# Patient Record
Sex: FEMALE | Race: WHITE
[De-identification: names, ages, dates, MRNs, and addresses within clinical notes are randomized per-mention and may not be internally consistent; named-entity substitution may affect disease eponyms.]

---

## 2017-01-14 ENCOUNTER — HOSPITAL ENCOUNTER (EMERGENCY)
Dept: HOSPITAL 10 - E/R | Age: 52
Discharge: HOME | End: 2017-01-14
Payer: MEDICAID

## 2017-01-14 VITALS
WEIGHT: 177.47 LBS | WEIGHT: 177.47 LBS | HEIGHT: 62 IN | BODY MASS INDEX: 32.66 KG/M2 | BODY MASS INDEX: 32.66 KG/M2 | HEIGHT: 62 IN

## 2017-01-14 VITALS
SYSTOLIC BLOOD PRESSURE: 118 MMHG | DIASTOLIC BLOOD PRESSURE: 76 MMHG | RESPIRATION RATE: 16 BRPM | TEMPERATURE: 98.1 F | HEART RATE: 78 BPM

## 2017-01-14 DIAGNOSIS — K80.50: Primary | ICD-10-CM

## 2017-01-14 DIAGNOSIS — R07.9: ICD-10-CM

## 2017-01-14 DIAGNOSIS — D64.9: ICD-10-CM

## 2017-01-14 DIAGNOSIS — R11.2: ICD-10-CM

## 2017-01-14 DIAGNOSIS — K21.9: ICD-10-CM

## 2017-01-14 LAB
ADD UMIC: YES
ALBUMIN SERPL-MCNC: 4.2 G/DL
ALBUMIN/GLOB SERPL: 1 {RATIO}
ALP SERPL-CCNC: 108 IU/L
ALT SERPL-CCNC: 36 IU/L
ANION GAP SERPL CALC-SCNC: 16 MMOL/L
APTT BLD: 29.6 SEC
AST SERPL-CCNC: 34 IU/L
BASOPHILS # BLD AUTO: 0.1 10^3/UL
BASOPHILS NFR BLD: 0.6 %
BILIRUB DIRECT SERPL-MCNC: 0 MG/DL
BILIRUB SERPL-MCNC: 0.2 MG/DL
BNP SERPL-MCNC: 129 PG/ML
BUN SERPL-MCNC: 12 MG/DL
CALCIUM SERPL-MCNC: 8.8 MG/DL
CHLORIDE SERPL-SCNC: 107 MMOL/L
CO2 SERPL-SCNC: 22 MMOL/L
COLOR UR: (no result)
CONDITION: 1
CREAT SERPL-MCNC: 0.56 MG/DL
EOSINOPHIL # BLD: 0.1 10^3/UL
EOSINOPHIL NFR BLD: 1 %
ERYTHROCYTE [DISTWIDTH] IN BLOOD BY AUTOMATED COUNT: 17 %
GLOBULIN SER-MCNC: 4.2 G/DL
GLUCOSE SERPL-MCNC: 112 MG/DL
GLUCOSE UR STRIP-MCNC: NEGATIVE %
HCT VFR BLD CALC: 30.7 %
HGB BLD-MCNC: 9.9 G/DL
INR PPP: 1.02
KETONES UR STRIP.AUTO-MCNC: NEGATIVE MG/DL
LH ANALYZER COMMENTS: 1
LYMPHOCYTES # BLD AUTO: 2 10^3/UL
LYMPHOCYTES NFR BLD AUTO: 24.5 %
MCH RBC QN AUTO: 26.5 PG
MCHC RBC AUTO-ENTMCNC: 32.2 G/DL
MCV RBC AUTO: 82.1 FL
MONOCYTES # BLD: 0.4 10^3/UL
MONOCYTES NFR BLD: 4.9 %
NEUTROPHILS # BLD: 5.5 10^3/UL
NEUTROPHILS NFR BLD AUTO: 69 %
NITRITE UR QL STRIP.AUTO: NEGATIVE
NRBC # BLD MANUAL: 0 10^3/UL
NRBC BLD QL: 0 /100WBC
PLATELET # BLD: 420 10^3/UL
PMV BLD AUTO: 7.5 FL
POTASSIUM SERPL-SCNC: 4.3 MMOL/L
PROT SERPL-MCNC: 8.4 G/DL
PROTHROMBIN TIME: 13.4 SEC
PT RATIO: 1
RBC # BLD AUTO: 3.74 10^6/UL
RBC # UR AUTO: (no result) /UL
RBC #/AREA URNS HPF: (no result) /HPF
SODIUM SERPL-SCNC: 141 MMOL/L
SQUAMOUS #/AREA URNS HPF: (no result) /[HPF]
TROPONIN I SERPL-MCNC: < 0.012 NG/ML
URINE BILIRUBIN (DIP): NEGATIVE
URINE TOTAL PROTEIN (DIP): NEGATIVE
UROBILINOGEN UR STRIP-ACNC: (no result)
WBC # BLD AUTO: 8 10^3/UL
WBC # BLD: 8 10^3/UL
WBC # UR STRIP: NEGATIVE /UL

## 2017-01-14 PROCEDURE — 76705 ECHO EXAM OF ABDOMEN: CPT

## 2017-01-14 PROCEDURE — 83880 ASSAY OF NATRIURETIC PEPTIDE: CPT

## 2017-01-14 PROCEDURE — 84484 ASSAY OF TROPONIN QUANT: CPT

## 2017-01-14 PROCEDURE — 96374 THER/PROPH/DIAG INJ IV PUSH: CPT

## 2017-01-14 PROCEDURE — 83690 ASSAY OF LIPASE: CPT

## 2017-01-14 PROCEDURE — 71010: CPT

## 2017-01-14 PROCEDURE — 85025 COMPLETE CBC W/AUTO DIFF WBC: CPT

## 2017-01-14 PROCEDURE — 81003 URINALYSIS AUTO W/O SCOPE: CPT

## 2017-01-14 PROCEDURE — 80053 COMPREHEN METABOLIC PANEL: CPT

## 2017-01-14 PROCEDURE — 81001 URINALYSIS AUTO W/SCOPE: CPT

## 2017-01-14 PROCEDURE — 36415 COLL VENOUS BLD VENIPUNCTURE: CPT

## 2017-01-14 PROCEDURE — 85730 THROMBOPLASTIN TIME PARTIAL: CPT

## 2017-01-14 PROCEDURE — 96375 TX/PRO/DX INJ NEW DRUG ADDON: CPT

## 2017-01-14 PROCEDURE — 93005 ELECTROCARDIOGRAM TRACING: CPT

## 2017-01-14 PROCEDURE — 85610 PROTHROMBIN TIME: CPT

## 2017-01-14 NOTE — RADRPT
PROCEDURE:   Abdominal ultrasound

 

CLINICAL INDICATION:  Abdominal pain

 

TECHNIQUE:   Gray scale, color Doppler, and spectral Doppler ultrasound images of the right upper qu
adrant. 

 

COMPARISON:   Abdominal ultrasound 07/10/2015 

 

FINDINGS:

 

Pancreas:  Visualized portions appear of normal echogenicity, no focal lesions.

 

Liver: 

Morphology:  Normal in size and contour.

Echogenicity:  Increased echogenicity of the liver parenchyma suggestive of hepatic steatosis.

Focal lesions:   None.

Main portal vein: Patent with hepatopetal flow.

 

 

Biliary System: 

Normal appearing gallbladder wall.

Gallstones are present within the gallbladder.

No intra or extra-hepatic biliary dilatation. 

Common bile duct measures 1.8 mm in maximal dimension.   

 

Kidneys:

Right 9.5 cm in length. 

Normal echogenicity.

No hydronephrosis.

No focal lesions or renal calculi.

 

No free fluid identified.

 

 

IMPRESSION:

Cholelithiasis without evidence of abnormal gallbladder wall thickening to suggest cholecystitis.

Normal caliber of the intrahepatic and extrahepatic biliary system.

Increased echogenicity of the liver parenchyma suggestive of hepatic steatosis.  

 

 

RPTAT: AADD

_____________________________________________ 

.Tanner Gresham MD, MD           Date    Time 

Electronically viewed and signed by .Tanner Gresham MD, MD on 01/14/2017 07:40 

 

D:  01/14/2017 07:40  T:  01/14/2017 07:40

.B/

## 2017-01-14 NOTE — RADRPT
PROCEDURE:   CHEST - 1 VIEW  

 

CLINICAL INDICATION:   51-year-old female with chest pain. 

 

TECHNIQUE:   A single frontal AP view of the chest was performed portably.  The images were reviewed
 on a PACS workstation. 

 

COMPARISON:   None.  

 

FINDINGS:

 

The cardiomediastinal silhouette has a normal appearance.  There is no evidence for an infiltrate.  
There is no evidence for congestive heart failure. There is no evidence for pneumothorax. The osseou
s structures are intact. 

 

IMPRESSION:

 

No evidence for active cardiopulmonary disease.

_____________________________________________ 

.Gerald Chiang MD, MD           Date    Time 

Electronically viewed and signed by .Gerald Chiang MD, MD on 01/14/2017 07:46 

 

D:  01/14/2017 07:46  T:  01/14/2017 07:46

.M/

## 2018-05-12 ENCOUNTER — HOSPITAL ENCOUNTER (EMERGENCY)
Dept: HOSPITAL 91 - FTE | Age: 53
Discharge: HOME | End: 2018-05-12
Payer: COMMERCIAL

## 2018-05-12 ENCOUNTER — HOSPITAL ENCOUNTER (EMERGENCY)
Age: 53
Discharge: HOME | End: 2018-05-12

## 2018-05-12 DIAGNOSIS — M25.551: ICD-10-CM

## 2018-05-12 DIAGNOSIS — M25.552: ICD-10-CM

## 2018-05-12 DIAGNOSIS — S70.11XA: Primary | ICD-10-CM

## 2018-05-12 DIAGNOSIS — S30.1XXA: ICD-10-CM
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
## 2018-05-12 PROCEDURE — 76705 ECHO EXAM OF ABDOMEN: CPT

## 2018-05-12 PROCEDURE — 99284 EMERGENCY DEPT VISIT MOD MDM: CPT

## 2018-05-12 PROCEDURE — 73520: CPT

## 2018-05-20 ENCOUNTER — HOSPITAL ENCOUNTER (EMERGENCY)
Age: 53
Discharge: HOME | End: 2018-05-20

## 2018-05-20 ENCOUNTER — HOSPITAL ENCOUNTER (EMERGENCY)
Dept: HOSPITAL 91 - FTE | Age: 53
Discharge: HOME | End: 2018-05-20
Payer: COMMERCIAL

## 2018-05-20 DIAGNOSIS — S70.01XA: ICD-10-CM

## 2018-05-20 DIAGNOSIS — S30.1XXA: Primary | ICD-10-CM

## 2018-05-20 DIAGNOSIS — S70.11XA: ICD-10-CM
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
## 2018-05-20 LAB
ADD MAN DIFF?: NO
ADD UMIC: YES
ALANINE AMINOTRANSFERASE: 33 IU/L (ref 13–69)
ALBUMIN/GLOBULIN RATIO: 1.04
ALBUMIN: 4.4 G/DL (ref 3.3–4.9)
ALKALINE PHOSPHATASE: 125 IU/L (ref 42–121)
ANION GAP: 17 (ref 8–16)
ASPARTATE AMINO TRANSFERASE: 29 IU/L (ref 15–46)
BASOPHIL #: 0 10^3/UL (ref 0–0.1)
BASOPHILS %: 0.2 % (ref 0–2)
BILIRUBIN,DIRECT: 0 MG/DL (ref 0–0.2)
BILIRUBIN,TOTAL: 0.5 MG/DL (ref 0.2–1.3)
BLOOD UREA NITROGEN: 10 MG/DL (ref 7–20)
CALCIUM: 9.1 MG/DL (ref 8.4–10.2)
CARBON DIOXIDE: 28 MMOL/L (ref 21–31)
CHLORIDE: 106 MMOL/L (ref 97–110)
CREATININE: 0.6 MG/DL (ref 0.44–1)
EOSINOPHILS #: 0.2 10^3/UL (ref 0–0.5)
EOSINOPHILS %: 2.4 % (ref 0–7)
GLOBULIN: 4.2 G/DL (ref 1.3–3.2)
GLUCOSE: 116 MG/DL (ref 70–220)
HEMATOCRIT: 43.7 % (ref 37–47)
HEMOGLOBIN: 14.1 G/DL (ref 12–16)
INR: 1.01
LIPASE: 68 U/L (ref 23–300)
LYMPHOCYTES #: 2.4 10^3/UL (ref 0.8–2.9)
LYMPHOCYTES %: 26.9 % (ref 15–51)
MEAN CORPUSCULAR HEMOGLOBIN: 29.2 PG (ref 29–33)
MEAN CORPUSCULAR HGB CONC: 32.3 G/DL (ref 32–37)
MEAN CORPUSCULAR VOLUME: 90.5 FL (ref 82–101)
MEAN PLATELET VOLUME: 9.3 FL (ref 7.4–10.4)
MONOCYTE #: 0.5 10^3/UL (ref 0.3–0.9)
MONOCYTES %: 5.3 % (ref 0–11)
NEUTROPHIL #: 5.6 10^3/UL (ref 1.6–7.5)
NEUTROPHILS %: 64.7 % (ref 39–77)
NUCLEATED RED BLOOD CELLS #: 0 10^3/UL (ref 0–0)
NUCLEATED RED BLOOD CELLS%: 0 /100WBC (ref 0–0)
PARTIAL THROMBOPLASTIN TIME: 28.3 SEC (ref 25–35)
PLATELET COUNT: 345 10^3/UL (ref 140–415)
POTASSIUM: 4 MMOL/L (ref 3.5–5.1)
PROTIME: 13.4 SEC (ref 11.9–14.9)
PT RATIO: 1
RED BLOOD COUNT: 4.83 10^6/UL (ref 4.2–5.4)
RED CELL DISTRIBUTION WIDTH: 12.5 % (ref 11.5–14.5)
SODIUM: 147 MMOL/L (ref 135–144)
TOTAL PROTEIN: 8.6 G/DL (ref 6.1–8.1)
UR ASCORBIC ACID: NEGATIVE MG/DL
UR BILIRUBIN (DIP): NEGATIVE MG/DL
UR BLOOD (DIP): (no result) MG/DL
UR CLARITY: CLEAR
UR COLOR: COLORLESS
UR GLUCOSE (DIP): NEGATIVE MG/DL
UR KETONES (DIP): NEGATIVE MG/DL
UR LEUKOCYTE ESTERASE (DIP): NEGATIVE LEU/UL
UR NITRITE (DIP): NEGATIVE MG/DL
UR PH (DIP): 6 (ref 5–9)
UR RBC: 0 /HPF (ref 0–5)
UR SPECIFIC GRAVITY (DIP): 1 (ref 1–1.03)
UR TOTAL PROTEIN (DIP): NEGATIVE MG/DL
UR UROBILINOGEN (DIP): NEGATIVE MG/DL
UR WBC: 0 /HPF (ref 0–5)
WHITE BLOOD COUNT: 8.7 10^3/UL (ref 4.8–10.8)

## 2018-05-20 PROCEDURE — 71260 CT THORAX DX C+: CPT

## 2018-05-20 PROCEDURE — 80053 COMPREHEN METABOLIC PANEL: CPT

## 2018-05-20 PROCEDURE — 99285 EMERGENCY DEPT VISIT HI MDM: CPT

## 2018-05-20 PROCEDURE — 74177 CT ABD & PELVIS W/CONTRAST: CPT

## 2018-05-20 PROCEDURE — 85610 PROTHROMBIN TIME: CPT

## 2018-05-20 PROCEDURE — 83690 ASSAY OF LIPASE: CPT

## 2018-05-20 PROCEDURE — 81025 URINE PREGNANCY TEST: CPT

## 2018-05-20 PROCEDURE — 36415 COLL VENOUS BLD VENIPUNCTURE: CPT

## 2018-05-20 PROCEDURE — 81001 URINALYSIS AUTO W/SCOPE: CPT

## 2018-05-20 PROCEDURE — 85730 THROMBOPLASTIN TIME PARTIAL: CPT

## 2018-05-20 PROCEDURE — 85025 COMPLETE CBC W/AUTO DIFF WBC: CPT

## 2018-05-20 RX ADMIN — ONDANSETRON HYDROCHLORIDE 1 MG: 2 INJECTION, SOLUTION INTRAMUSCULAR; INTRAVENOUS at 11:41
